# Patient Record
Sex: FEMALE | Race: WHITE | NOT HISPANIC OR LATINO
[De-identification: names, ages, dates, MRNs, and addresses within clinical notes are randomized per-mention and may not be internally consistent; named-entity substitution may affect disease eponyms.]

---

## 2022-10-12 ENCOUNTER — NON-APPOINTMENT (OUTPATIENT)
Age: 57
End: 2022-10-12

## 2022-10-12 ENCOUNTER — OUTPATIENT (OUTPATIENT)
Dept: OUTPATIENT SERVICES | Facility: HOSPITAL | Age: 57
LOS: 1 days | Discharge: HOME | End: 2022-10-12

## 2022-10-12 DIAGNOSIS — C91.11 CHRONIC LYMPHOCYTIC LEUKEMIA OF B-CELL TYPE IN REMISSION: ICD-10-CM

## 2022-10-12 PROBLEM — C91.10 CLL (CHRONIC LYMPHOCYTIC LEUKEMIA): Status: ACTIVE | Noted: 2022-10-12

## 2022-10-12 PROBLEM — Z00.00 ENCOUNTER FOR PREVENTIVE HEALTH EXAMINATION: Status: ACTIVE | Noted: 2022-10-12

## 2022-10-13 ENCOUNTER — APPOINTMENT (OUTPATIENT)
Dept: HEMATOLOGY ONCOLOGY | Facility: CLINIC | Age: 57
End: 2022-10-13

## 2022-10-13 ENCOUNTER — NON-APPOINTMENT (OUTPATIENT)
Age: 57
End: 2022-10-13

## 2022-10-13 DIAGNOSIS — C91.10 CHRONIC LYMPHOCYTIC LEUKEMIA OF B-CELL TYPE NOT HAVING ACHIEVED REMISSION: ICD-10-CM

## 2022-10-13 PROCEDURE — 99214 OFFICE O/P EST MOD 30 MIN: CPT | Mod: 95

## 2022-10-16 NOTE — RESULTS/DATA
[FreeTextEntry1] : CBC trend:\par 11/20/2019: IgG 801\par 01/29/2020: ,000, Hgb 12.1, ,000. FISH normal\par 03/09/2020: ,000, Hgb 11.2, ,000.\par 03/23/2020: WBC 70.0, Hgb 11.5, ,000\par 03/30/2020: WBC 54.6, Hgb 11.5, ,000 (four infusion of rituximab)\par 05/29/2020: WBC 24.1, Hgb 12.1, ,000\par 10/26/2020: WBC 55.2, Hgb 12.7, PLT 98,000\par 11/19/2020: WBC 65.2, Hgb 12.9, ,000\par \par \par

## 2022-10-16 NOTE — HISTORY OF PRESENT ILLNESS
[de-identified] : [place] was last seen by myself at [facility] on 6/13/22 [de-identified] : Since last visit: bilateral mastectomy (8/26/22): left internal mammary, right internal and a second right internal mammary : SLL transforming into high-Grade Lymphoma.  \par PET scan (9/22/22): S/P bilateral mastectomy and reconstructive surgery.  No hypermetabolic suspicious fausto disease or metastases. \par \par \par Medications\par Clarinex\par TUMS\par valtex prn\par Albuterol ihn PRN\par CPAP\par Imbruvica (Sept 2021 - )\par Evusheld (2/12/22)\par \par \par No known drug allergies.\par \par Review of systems: Felicia does not spontaneously report fever, chills, sweats, weight loss, skin rash,petechiae, bruising, eye irritation, hearing loss, mouth sores, sore throat, hemoptysis, pleuritic chest pain,dyspnea, change in vision, focal numbness or weakness, chest pains, palpitations, nausea, vomiting,constipation, dysuria, hematuria, bowel or bladder incontinence, severe joint pain, back pain, or gait disturbance.\par

## 2022-10-16 NOTE — ASSESSMENT
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 Rituximab induction (completed 3/30/20), rituximab maintenance (single infusion 11/19/20)\par \par PMH: sleep apnea (CPAP), BPV, bilateral mastectomy for DICS (8/26/22) (in remission) \par \par Plan:\par Heme: Continue Imbruvica, 420mg daily.\par Favor transition to venetoclax at this time for DELMI limited therapy.  Given the multiple noncontagious lymph nodes being read as accelerated disease, I find this unlikely.  Often transformation is not global but starts at a single site.  I suspect this is histologic changes secondary to BTKi in a responding person.  If I am incorrect, then she is developing transformation on BTKi and therapy should be changes regardless. \par \par Clonoseq: obtained; repeat at this time. \par \par ID: COVID serologies (6/17/21): no immunologic response; Received COVID booster (8/31/21):\par Evusheld: 2/12/22, COVID IgG (7/2222): 35.2 -- protective  \par IgG (6/17/21): 786; suggest repeating at this time. \par \par Over 39 minutes were spent in direct patient care today with nearly the entirety of time discussing her lymph node biopsies, MRD and COVID.

## 2022-12-21 ENCOUNTER — TRANSCRIPTION ENCOUNTER (OUTPATIENT)
Age: 57
End: 2022-12-21